# Patient Record
Sex: FEMALE | Race: OTHER | HISPANIC OR LATINO | URBAN - METROPOLITAN AREA
[De-identification: names, ages, dates, MRNs, and addresses within clinical notes are randomized per-mention and may not be internally consistent; named-entity substitution may affect disease eponyms.]

---

## 2020-02-04 ENCOUNTER — EMERGENCY (EMERGENCY)
Facility: HOSPITAL | Age: 55
LOS: 0 days | Discharge: HOME | End: 2020-02-04
Attending: EMERGENCY MEDICINE | Admitting: EMERGENCY MEDICINE
Payer: COMMERCIAL

## 2020-02-04 VITALS
RESPIRATION RATE: 20 BRPM | DIASTOLIC BLOOD PRESSURE: 72 MMHG | OXYGEN SATURATION: 98 % | HEART RATE: 126 BPM | SYSTOLIC BLOOD PRESSURE: 140 MMHG | TEMPERATURE: 101 F

## 2020-02-04 VITALS — HEART RATE: 86 BPM

## 2020-02-04 DIAGNOSIS — Z98.89 OTHER SPECIFIED POSTPROCEDURAL STATES: Chronic | ICD-10-CM

## 2020-02-04 DIAGNOSIS — M79.10 MYALGIA, UNSPECIFIED SITE: ICD-10-CM

## 2020-02-04 DIAGNOSIS — R11.2 NAUSEA WITH VOMITING, UNSPECIFIED: ICD-10-CM

## 2020-02-04 DIAGNOSIS — Z87.59 PERSONAL HISTORY OF OTHER COMPLICATIONS OF PREGNANCY, CHILDBIRTH AND THE PUERPERIUM: ICD-10-CM

## 2020-02-04 DIAGNOSIS — R50.9 FEVER, UNSPECIFIED: ICD-10-CM

## 2020-02-04 DIAGNOSIS — R05 COUGH: ICD-10-CM

## 2020-02-04 LAB
ALBUMIN SERPL ELPH-MCNC: 3.9 G/DL — SIGNIFICANT CHANGE UP (ref 3.5–5.2)
ALP SERPL-CCNC: 125 U/L — HIGH (ref 30–115)
ALT FLD-CCNC: 23 U/L — SIGNIFICANT CHANGE UP (ref 0–41)
ANION GAP SERPL CALC-SCNC: 16 MMOL/L — HIGH (ref 7–14)
AST SERPL-CCNC: 33 U/L — SIGNIFICANT CHANGE UP (ref 0–41)
BASE EXCESS BLDV CALC-SCNC: -2.1 MMOL/L — LOW (ref -2–2)
BASE EXCESS BLDV CALC-SCNC: -2.2 MMOL/L — LOW (ref -2–2)
BASE EXCESS BLDV CALC-SCNC: -3 MMOL/L — LOW (ref -2–2)
BASOPHILS # BLD AUTO: 0.01 K/UL — SIGNIFICANT CHANGE UP (ref 0–0.2)
BASOPHILS NFR BLD AUTO: 0.1 % — SIGNIFICANT CHANGE UP (ref 0–1)
BILIRUB SERPL-MCNC: 0.7 MG/DL — SIGNIFICANT CHANGE UP (ref 0.2–1.2)
BUN SERPL-MCNC: 30 MG/DL — HIGH (ref 10–20)
CA-I SERPL-SCNC: 1.08 MMOL/L — LOW (ref 1.12–1.3)
CA-I SERPL-SCNC: 1.08 MMOL/L — LOW (ref 1.12–1.3)
CA-I SERPL-SCNC: 1.09 MMOL/L — LOW (ref 1.12–1.3)
CALCIUM SERPL-MCNC: 8.7 MG/DL — SIGNIFICANT CHANGE UP (ref 8.5–10.1)
CHLORIDE SERPL-SCNC: 87 MMOL/L — LOW (ref 98–110)
CO2 SERPL-SCNC: 20 MMOL/L — SIGNIFICANT CHANGE UP (ref 17–32)
CREAT SERPL-MCNC: 1.2 MG/DL — SIGNIFICANT CHANGE UP (ref 0.7–1.5)
EOSINOPHIL # BLD AUTO: 0 K/UL — SIGNIFICANT CHANGE UP (ref 0–0.7)
EOSINOPHIL NFR BLD AUTO: 0 % — SIGNIFICANT CHANGE UP (ref 0–8)
GAS PNL BLDV: 129 MMOL/L — LOW (ref 136–145)
GAS PNL BLDV: 129 MMOL/L — LOW (ref 136–145)
GAS PNL BLDV: 133 MMOL/L — LOW (ref 136–145)
GAS PNL BLDV: SIGNIFICANT CHANGE UP
GLUCOSE SERPL-MCNC: 519 MG/DL — CRITICAL HIGH (ref 70–99)
HCO3 BLDV-SCNC: 22 MMOL/L — SIGNIFICANT CHANGE UP (ref 22–29)
HCO3 BLDV-SCNC: 22 MMOL/L — SIGNIFICANT CHANGE UP (ref 22–29)
HCO3 BLDV-SCNC: 23 MMOL/L — SIGNIFICANT CHANGE UP (ref 22–29)
HCT VFR BLD CALC: 34 % — LOW (ref 37–47)
HCT VFR BLDA CALC: 30.9 % — LOW (ref 34–44)
HCT VFR BLDA CALC: 34.3 % — SIGNIFICANT CHANGE UP (ref 34–44)
HCT VFR BLDA CALC: 35 % — SIGNIFICANT CHANGE UP (ref 34–44)
HGB BLD CALC-MCNC: 10.1 G/DL — LOW (ref 14–18)
HGB BLD CALC-MCNC: 11.2 G/DL — LOW (ref 14–18)
HGB BLD CALC-MCNC: 11.4 G/DL — LOW (ref 14–18)
HGB BLD-MCNC: 12 G/DL — SIGNIFICANT CHANGE UP (ref 12–16)
IMM GRANULOCYTES NFR BLD AUTO: 0.6 % — HIGH (ref 0.1–0.3)
LACTATE BLDV-MCNC: 1.9 MMOL/L — HIGH (ref 0.5–1.6)
LACTATE BLDV-MCNC: 2.7 MMOL/L — HIGH (ref 0.5–1.6)
LACTATE BLDV-MCNC: 3.3 MMOL/L — HIGH (ref 0.5–1.6)
LACTATE SERPL-SCNC: 2.1 MMOL/L — HIGH (ref 0.7–2)
LYMPHOCYTES # BLD AUTO: 0.68 K/UL — LOW (ref 1.2–3.4)
LYMPHOCYTES # BLD AUTO: 10.2 % — LOW (ref 20.5–51.1)
MCHC RBC-ENTMCNC: 31.7 PG — HIGH (ref 27–31)
MCHC RBC-ENTMCNC: 35.3 G/DL — SIGNIFICANT CHANGE UP (ref 32–37)
MCV RBC AUTO: 89.7 FL — SIGNIFICANT CHANGE UP (ref 81–99)
MONOCYTES # BLD AUTO: 0.66 K/UL — HIGH (ref 0.1–0.6)
MONOCYTES NFR BLD AUTO: 9.9 % — HIGH (ref 1.7–9.3)
NEUTROPHILS # BLD AUTO: 5.29 K/UL — SIGNIFICANT CHANGE UP (ref 1.4–6.5)
NEUTROPHILS NFR BLD AUTO: 79.2 % — HIGH (ref 42.2–75.2)
NRBC # BLD: 0 /100 WBCS — SIGNIFICANT CHANGE UP (ref 0–0)
PCO2 BLDV: 36 MMHG — LOW (ref 41–51)
PCO2 BLDV: 38 MMHG — LOW (ref 41–51)
PCO2 BLDV: 39 MMHG — LOW (ref 41–51)
PH BLDV: 7.37 — SIGNIFICANT CHANGE UP (ref 7.26–7.43)
PH BLDV: 7.38 — SIGNIFICANT CHANGE UP (ref 7.26–7.43)
PH BLDV: 7.4 — SIGNIFICANT CHANGE UP (ref 7.26–7.43)
PLATELET # BLD AUTO: 180 K/UL — SIGNIFICANT CHANGE UP (ref 130–400)
PO2 BLDV: 37 MMHG — SIGNIFICANT CHANGE UP (ref 20–40)
PO2 BLDV: 38 MMHG — SIGNIFICANT CHANGE UP (ref 20–40)
PO2 BLDV: 54 MMHG — HIGH (ref 20–40)
POTASSIUM BLDV-SCNC: 3.6 MMOL/L — SIGNIFICANT CHANGE UP (ref 3.3–5.6)
POTASSIUM BLDV-SCNC: 3.8 MMOL/L — SIGNIFICANT CHANGE UP (ref 3.3–5.6)
POTASSIUM BLDV-SCNC: 4.1 MMOL/L — SIGNIFICANT CHANGE UP (ref 3.3–5.6)
POTASSIUM SERPL-MCNC: 4.4 MMOL/L — SIGNIFICANT CHANGE UP (ref 3.5–5)
POTASSIUM SERPL-SCNC: 4.4 MMOL/L — SIGNIFICANT CHANGE UP (ref 3.5–5)
PROT SERPL-MCNC: 7.6 G/DL — SIGNIFICANT CHANGE UP (ref 6–8)
RBC # BLD: 3.79 M/UL — LOW (ref 4.2–5.4)
RBC # FLD: 11.9 % — SIGNIFICANT CHANGE UP (ref 11.5–14.5)
SAO2 % BLDV: 69 % — SIGNIFICANT CHANGE UP
SAO2 % BLDV: 71 % — SIGNIFICANT CHANGE UP
SAO2 % BLDV: 88 % — SIGNIFICANT CHANGE UP
SODIUM SERPL-SCNC: 123 MMOL/L — LOW (ref 135–146)
WBC # BLD: 6.68 K/UL — SIGNIFICANT CHANGE UP (ref 4.8–10.8)
WBC # FLD AUTO: 6.68 K/UL — SIGNIFICANT CHANGE UP (ref 4.8–10.8)

## 2020-02-04 PROCEDURE — 71046 X-RAY EXAM CHEST 2 VIEWS: CPT | Mod: 26

## 2020-02-04 PROCEDURE — 99284 EMERGENCY DEPT VISIT MOD MDM: CPT

## 2020-02-04 RX ORDER — INSULIN HUMAN 100 [IU]/ML
6 INJECTION, SOLUTION SUBCUTANEOUS ONCE
Refills: 0 | Status: COMPLETED | OUTPATIENT
Start: 2020-02-04 | End: 2020-02-04

## 2020-02-04 RX ORDER — SODIUM CHLORIDE 9 MG/ML
1000 INJECTION INTRAMUSCULAR; INTRAVENOUS; SUBCUTANEOUS ONCE
Refills: 0 | Status: COMPLETED | OUTPATIENT
Start: 2020-02-04 | End: 2020-02-04

## 2020-02-04 RX ORDER — ONDANSETRON 8 MG/1
4 TABLET, FILM COATED ORAL ONCE
Refills: 0 | Status: COMPLETED | OUTPATIENT
Start: 2020-02-04 | End: 2020-02-04

## 2020-02-04 RX ORDER — INSULIN HUMAN 100 [IU]/ML
8 INJECTION, SOLUTION SUBCUTANEOUS ONCE
Refills: 0 | Status: COMPLETED | OUTPATIENT
Start: 2020-02-04 | End: 2020-02-04

## 2020-02-04 RX ORDER — ACETAMINOPHEN 500 MG
650 TABLET ORAL ONCE
Refills: 0 | Status: COMPLETED | OUTPATIENT
Start: 2020-02-04 | End: 2020-02-04

## 2020-02-04 RX ORDER — KETOROLAC TROMETHAMINE 30 MG/ML
30 SYRINGE (ML) INJECTION ONCE
Refills: 0 | Status: DISCONTINUED | OUTPATIENT
Start: 2020-02-04 | End: 2020-02-04

## 2020-02-04 RX ORDER — SODIUM CHLORIDE 9 MG/ML
1000 INJECTION, SOLUTION INTRAVENOUS ONCE
Refills: 0 | Status: COMPLETED | OUTPATIENT
Start: 2020-02-04 | End: 2020-02-04

## 2020-02-04 RX ADMIN — SODIUM CHLORIDE 1000 MILLILITER(S): 9 INJECTION, SOLUTION INTRAVENOUS at 16:17

## 2020-02-04 RX ADMIN — INSULIN HUMAN 6 UNIT(S): 100 INJECTION, SOLUTION SUBCUTANEOUS at 17:51

## 2020-02-04 RX ADMIN — INSULIN HUMAN 8 UNIT(S): 100 INJECTION, SOLUTION SUBCUTANEOUS at 16:17

## 2020-02-04 RX ADMIN — SODIUM CHLORIDE 1000 MILLILITER(S): 9 INJECTION INTRAMUSCULAR; INTRAVENOUS; SUBCUTANEOUS at 14:24

## 2020-02-04 RX ADMIN — Medication 650 MILLIGRAM(S): at 14:22

## 2020-02-04 RX ADMIN — SODIUM CHLORIDE 1000 MILLILITER(S): 9 INJECTION INTRAMUSCULAR; INTRAVENOUS; SUBCUTANEOUS at 21:21

## 2020-02-04 RX ADMIN — Medication 650 MILLIGRAM(S): at 16:00

## 2020-02-04 RX ADMIN — ONDANSETRON 4 MILLIGRAM(S): 8 TABLET, FILM COATED ORAL at 14:23

## 2020-02-04 RX ADMIN — Medication 30 MILLIGRAM(S): at 16:17

## 2020-02-04 NOTE — ED PROVIDER NOTE - ATTENDING CONTRIBUTION TO CARE
53 y/o female with h/o dm, hld, htn, in ER with c/o flu-like symptoms which started 4 days ago.  + fever and chills.  + diffuse body aches.  + non-productive cough, rhinorrhea.  + N and V.  no cp/sob.  no abd pain.  no urinary symptoms.  no ha/dizziness/loc.    PE - nad, nc/at, eomi, perrl, op - clear, cta b/l, no w/r/r, rrr, abd- soft, nt/nd, nabs, from x 4, A&O x 3, no focal neuro deficits.  -ivf, check labs, cxr, re-eval.

## 2020-02-04 NOTE — ED PROVIDER NOTE - CLINICAL SUMMARY MEDICAL DECISION MAKING FREE TEXT BOX
Pt in ER with 4 day h/o flu-like symptoms. given ivf.  cxr - neg.  labs with glucose 515 - h/o DM, hasn't taken her meds for a few days due to not feeling well.  Na 123, but corrects to 135 due to hyperglycemia.  initial lactate 3.3, ph normal. pt feeling much better after IVF, repeat lactate 1.9, repeat , HR decreased to 80's, no longer feeling nauseated, pt tolerating PO.  to d/c home with continued oral hydration, prn tylenol/motrin.  given 4 days of symptoms no benefit to tamiflu. pt to f/u with pmd, told to return to ER if she feels worse or for any other new/concerning symptoms.  pt understands and agrees with plan.

## 2020-02-04 NOTE — ED PROVIDER NOTE - OBJECTIVE STATEMENT
53 y/o F, PMHx NIDDM, HTN, Dyslipidemia, & peripheral vascular disease (on eliquis), presents to the ED with complaints of body-aches and fever x four days. She denies admits to associated nasal congestion and non-productive cough; denies nausea, vomiting, chest pain, dyspnea, back pain, abdominal pain and recent travel. She commutes to the city however no known sick contacts recently. She took Tylenol 325mg at 0700 this AM.

## 2020-02-04 NOTE — ED PROVIDER NOTE - PROGRESS NOTE DETAILS
Patient states that she has not taken her Metformin and Trujenta for the past 4 days. Reviewed all results and necessity for follow up. Counseled on red flags and to return for them.  Patient appears well on discharge.

## 2020-02-04 NOTE — ED ADULT NURSE NOTE - OBJECTIVE STATEMENT
Patient is a 54 year old female presents to ED with complaints of flu-like symptoms since Saturday , nausea, vomiting, cough, fever body aches and congestion, and decreased Po intake. Patient denies any chest pain, abdominal pain, urinary symptoms.

## 2020-02-04 NOTE — ED PROVIDER NOTE - PATIENT PORTAL LINK FT
You can access the FollowMyHealth Patient Portal offered by [facility] by registering at the following website: [email or web address]. By joining [employer]’s FollowMyHealth portal, you will also be able to view your health information using other applications (apps) compatible with our system.

## 2020-02-04 NOTE — ED ADULT NURSE REASSESSMENT NOTE - NS ED NURSE REASSESS COMMENT FT1
patient assessed, patient alert and oriented x4. Labs sent pending results. awaiting chest x ray. Tylenol PO given for temp ,will reassess and monitor,

## 2022-04-29 ENCOUNTER — EMERGENCY (EMERGENCY)
Facility: HOSPITAL | Age: 57
LOS: 0 days | Discharge: HOME | End: 2022-04-29
Attending: EMERGENCY MEDICINE | Admitting: EMERGENCY MEDICINE
Payer: COMMERCIAL

## 2022-04-29 VITALS
TEMPERATURE: 97 F | RESPIRATION RATE: 18 BRPM | HEIGHT: 62 IN | DIASTOLIC BLOOD PRESSURE: 68 MMHG | WEIGHT: 184.97 LBS | SYSTOLIC BLOOD PRESSURE: 128 MMHG | HEART RATE: 99 BPM | OXYGEN SATURATION: 98 %

## 2022-04-29 DIAGNOSIS — M54.50 LOW BACK PAIN, UNSPECIFIED: ICD-10-CM

## 2022-04-29 DIAGNOSIS — E11.9 TYPE 2 DIABETES MELLITUS WITHOUT COMPLICATIONS: ICD-10-CM

## 2022-04-29 DIAGNOSIS — Z98.89 OTHER SPECIFIED POSTPROCEDURAL STATES: Chronic | ICD-10-CM

## 2022-04-29 DIAGNOSIS — Z79.01 LONG TERM (CURRENT) USE OF ANTICOAGULANTS: ICD-10-CM

## 2022-04-29 DIAGNOSIS — Z79.84 LONG TERM (CURRENT) USE OF ORAL HYPOGLYCEMIC DRUGS: ICD-10-CM

## 2022-04-29 DIAGNOSIS — Z79.82 LONG TERM (CURRENT) USE OF ASPIRIN: ICD-10-CM

## 2022-04-29 DIAGNOSIS — M25.552 PAIN IN LEFT HIP: ICD-10-CM
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
## 2022-04-29 DIAGNOSIS — I73.9 PERIPHERAL VASCULAR DISEASE, UNSPECIFIED: ICD-10-CM
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
## 2022-04-29 DIAGNOSIS — I10 ESSENTIAL (PRIMARY) HYPERTENSION: ICD-10-CM

## 2022-04-29 DIAGNOSIS — E78.5 HYPERLIPIDEMIA, UNSPECIFIED: ICD-10-CM

## 2022-04-29 PROCEDURE — 73502 X-RAY EXAM HIP UNI 2-3 VIEWS: CPT | Mod: 26,LT

## 2022-04-29 PROCEDURE — 99284 EMERGENCY DEPT VISIT MOD MDM: CPT

## 2022-04-29 RX ORDER — METHOCARBAMOL 500 MG/1
1500 TABLET, FILM COATED ORAL ONCE
Refills: 0 | Status: COMPLETED | OUTPATIENT
Start: 2022-04-29 | End: 2022-04-29

## 2022-04-29 RX ORDER — IBUPROFEN 200 MG
800 TABLET ORAL ONCE
Refills: 0 | Status: COMPLETED | OUTPATIENT
Start: 2022-04-29 | End: 2022-04-29

## 2022-04-29 RX ADMIN — Medication 800 MILLIGRAM(S): at 19:21

## 2022-04-29 RX ADMIN — METHOCARBAMOL 1500 MILLIGRAM(S): 500 TABLET, FILM COATED ORAL at 19:21

## 2022-04-29 NOTE — ED PROVIDER NOTE - PROGRESS NOTE DETAILS
MQ: Xray of left hip and pelvis normal, pain slightly improved, offered medications to be sent to the pharmacy, but patient refused, will d/c with PMD f/u
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
## 2022-04-29 NOTE — ED PROVIDER NOTE - NSFOLLOWUPINSTRUCTIONS_ED_ALL_ED_FT

## 2022-04-29 NOTE — ED PROVIDER NOTE - PATIENT PORTAL LINK FT
You can access the FollowMyHealth Patient Portal offered by [facility] by registering at the following website: [email or web address]. By joining "[employer]’s FollowMyHealth portal, you will also be able to view your health information using other applications (apps) compatible with our system.

## 2022-04-29 NOTE — ED ADULT NURSE NOTE - PRIMARY CARE PROVIDER
[FreeTextEntry1] : Lily is a 5 d/o ex-38.3 female born via  who presents for weight check and caput monitoring. She has had good weight gain since last visit (24g/day). Repeat bilirubin on DOL5 was low-risk. Physical exam today remarkable only for stable left parietal caput succadaneum, mild facial jaundice, and dry skin over torso and arms.\par \par #Caput succedaneum: size unchanged.\par - Will continue to monitor clinically.\par \par #Jaundice: resolving; repeat bilirubin on DOL5 13 (low-risk); now only on face.\par - Will continue to monitor clinically.\par \par #H/o right tongue deviation: not appreciated since in L&D.\par - Neuro appt scheduled for 3/5/20, but parents don't think they will keep it.\par \par #Dry skin:\par - Advised parents to avoid J&J.\par - Can apply Aquaphor/Vaseline immediately after bathing to lock in moisture.\par - Can consider baby versions of CeraVe, Eucerin, or Aveeno.\par \par #FHx of beta-thal minor:\par - NBS results still pending; will defer hematology consult until then.\par \par #Health maintenance:\par - Continue to breast-/bottle-feed ad isaías.\par - Anticipatory guidance given on when to worry about constipation, simethicone drop use, vaccine schedule, and danger of international air travel in 2020 while baby is still very young, has only just begun vaccination series, and it is still respiratory season.\par - V-UTD.\par - RTC in 2w for 1m WCC, or sooner if new concerns arise.  pcp

## 2022-04-29 NOTE — ED PROVIDER NOTE - NS ED ROS FT
Review of Systems:  •	CONSTITUTIONAL - No fever, No diaphoresis, No weight change  •	SKIN - No rash  •	HEMATOLOGIC - No abnormal bleeding or bruising  •	EYES - No eye pain, No blurred vision  •	ENT - No change in hearing, No sore throat, No neck pain, No rhinorrhea, No ear pain  •	RESPIRATORY - No shortness of breath, No cough  •	CARDIAC -No chest pain, No palpitations  •	GI - No abdominal pain, No nausea, No vomiting, No diarrhea, No constipation, No bright red blood per rectum or melena. No flank pain  •                 - No dysuria, frequency, hematuria.   •	ENDO - No polydypsia, No polyuria, No heat/cold intolerance  •	MUSCULOSKELETAL - + left hip pain, No swelling, + left lower back pain  •	NEUROLOGIC - No numbness, No focal weakness, No headache, No dizziness  All other systems negative, unless specified in HPI
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
## 2022-04-29 NOTE — ED PROVIDER NOTE - PHYSICAL EXAMINATION
CONSTITUTIONAL: Well-developed; well-nourished; in no acute distress.   SKIN: warm, dry  HEAD: Normocephalic; atraumatic.  EYES: no conjunctival injection. PERRL.   ENT: No nasal discharge; airway clear.  NECK: Supple; non tender.  CARD: S1, S2 normal; no murmurs, gallops, or rubs. Regular rate and rhythm.   RESP: No wheezes, rales or rhonchi.  ABD: soft ntnd  BACK: No midline tenderness, tenderness in left lower back  EXT: Normal ROM.  No clubbing, cyanosis or edema. Tender in left hip, but full ROM. Normal sensation and full strength in b/l LE. 2+ radial pulses b/l. Can ambulate normally.  LYMPH: No acute cervical adenopathy.  NEURO: Alert, oriented, grossly unremarkable  PSYCH: Cooperative, appropriate.

## 2022-04-29 NOTE — ED ADULT NURSE NOTE - NSICDXFAMILYHX_GEN_ALL_CORE_FT
FAMILY HISTORY:  Family history of acute myocardial infarction  Family history of diabetes mellitus    Sibling  Still living? Yes, Estimated age: Age Unknown  Family history of pancreatic cancer, Age at diagnosis: Age Unknown
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
## 2022-06-17 ENCOUNTER — EMERGENCY (EMERGENCY)
Facility: HOSPITAL | Age: 57
LOS: 0 days | Discharge: HOME | End: 2022-06-17
Attending: EMERGENCY MEDICINE | Admitting: EMERGENCY MEDICINE
Payer: COMMERCIAL

## 2022-06-17 VITALS
TEMPERATURE: 98 F | HEIGHT: 62 IN | OXYGEN SATURATION: 99 % | RESPIRATION RATE: 17 BRPM | HEART RATE: 84 BPM | DIASTOLIC BLOOD PRESSURE: 75 MMHG | WEIGHT: 184.97 LBS | SYSTOLIC BLOOD PRESSURE: 125 MMHG

## 2022-06-17 DIAGNOSIS — M54.6 PAIN IN THORACIC SPINE: ICD-10-CM

## 2022-06-17 DIAGNOSIS — G89.29 OTHER CHRONIC PAIN: ICD-10-CM

## 2022-06-17 DIAGNOSIS — Z87.828 PERSONAL HISTORY OF OTHER (HEALED) PHYSICAL INJURY AND TRAUMA: ICD-10-CM

## 2022-06-17 DIAGNOSIS — I10 ESSENTIAL (PRIMARY) HYPERTENSION: ICD-10-CM

## 2022-06-17 DIAGNOSIS — R51.9 HEADACHE, UNSPECIFIED: ICD-10-CM

## 2022-06-17 DIAGNOSIS — Z98.89 OTHER SPECIFIED POSTPROCEDURAL STATES: Chronic | ICD-10-CM

## 2022-06-17 DIAGNOSIS — E11.9 TYPE 2 DIABETES MELLITUS WITHOUT COMPLICATIONS: ICD-10-CM

## 2022-06-17 DIAGNOSIS — E78.5 HYPERLIPIDEMIA, UNSPECIFIED: ICD-10-CM

## 2022-06-17 DIAGNOSIS — Z79.84 LONG TERM (CURRENT) USE OF ORAL HYPOGLYCEMIC DRUGS: ICD-10-CM

## 2022-06-17 DIAGNOSIS — Z79.82 LONG TERM (CURRENT) USE OF ASPIRIN: ICD-10-CM

## 2022-06-17 PROCEDURE — 99284 EMERGENCY DEPT VISIT MOD MDM: CPT

## 2022-06-17 RX ORDER — KETOROLAC TROMETHAMINE 30 MG/ML
30 SYRINGE (ML) INJECTION ONCE
Refills: 0 | Status: DISCONTINUED | OUTPATIENT
Start: 2022-06-17 | End: 2022-06-17

## 2022-06-17 RX ORDER — IBUPROFEN 200 MG
1 TABLET ORAL
Qty: 21 | Refills: 0
Start: 2022-06-17 | End: 2022-06-23

## 2022-06-17 RX ORDER — TIZANIDINE 4 MG/1
1 TABLET ORAL
Qty: 21 | Refills: 0
Start: 2022-06-17 | End: 2022-06-23

## 2022-06-17 RX ORDER — METHOCARBAMOL 500 MG/1
1000 TABLET, FILM COATED ORAL ONCE
Refills: 0 | Status: COMPLETED | OUTPATIENT
Start: 2022-06-17 | End: 2022-06-17

## 2022-06-17 RX ADMIN — Medication 30 MILLIGRAM(S): at 10:43

## 2022-06-17 RX ADMIN — METHOCARBAMOL 1000 MILLIGRAM(S): 500 TABLET, FILM COATED ORAL at 10:43
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
## 2022-06-17 NOTE — ED PROVIDER NOTE - NSFOLLOWUPINSTRUCTIONS_ED_ALL_ED_FT
Our Emergency Department Referral Coordinators will be reaching out ot you in the next 24-48 hours from 9:00am to 5:00pm (Monday to Friday) with a follow up appointment. Please expect a phone call from the hospital in that time frame. If you do not receive a call or if you have any questions or concerns, you can reach them at [phone] or [phone].        Back Pain    Back pain is very common in adults. The cause of back pain is rarely dangerous and the pain often gets better over time. The cause of your back pain may not be known and may include strain of muscles or ligaments, degeneration of the spinal disks, or arthritis. Occasionally the pain may radiate down your leg(s). Over-the-counter medicines to reduce pain and inflammation are often the most helpful. Stretching and remaining active frequently helps the healing process.     SEEK IMMEDIATE MEDICAL CARE IF YOU HAVE THE FOLLOWING SYMPTOMS: bowel or bladder control problems, unusual weakness or numbness in your arms or legs, nausea or vomiting, abdominal pain, fever, dizziness/lightheadedness.

## 2022-06-17 NOTE — ED PROVIDER NOTE - CARE PROVIDER_API CALL
Diana Wise)  Neurosurgery  [address], [address]  [place] [address]  Phone: [phone]  Fax: [phone]  Follow Up Time:
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
## 2022-06-17 NOTE — ED PROVIDER NOTE - NS ED ATTENDING STATEMENT MOD
This was a shared visit with the MAYRA. I reviewed and verified the documentation and independently performed the documented:

## 2022-06-17 NOTE — ED PROVIDER NOTE - PHYSICAL EXAMINATION
VITAL SIGNS: I have reviewed nursing notes and confirm.  CONSTITUTIONAL: Well-developed; well-nourished; in no acute distress.   SKIN:  skin exam is warm and dry, no acute rash.    HEAD: Normocephalic; atraumatic.  EYES:  conjunctiva and sclera clear.  ENT: No nasal discharge; airway clear.  NECK: no c-spine tenderness Supple; non tender.  CARD: S1, S2 normal; no murmurs, gallops, or rubs. Regular rate and rhythm.   RESP: No wheezes, rales or rhonchi.  EXT: no midline spinal tenderness, muscle strength 5/5 throughout, sensation intact Normal ROM.  No clubbing, cyanosis or edema.   NEURO: Alert, oriented, grossly unremarkable

## 2022-06-17 NOTE — ED PROVIDER NOTE - NS ED ROS FT
Eyes:  No visual changes, eye pain or discharge.  ENMT:  No hearing changes, pain, discharge or infections. No neck pain or stiffness.  Cardiac:  No chest pain, SOB or edema. No chest pain with exertion.  Respiratory:  No cough or respiratory distress. No hemoptysis. No history of asthma or RAD.  GI:  No nausea, vomiting, diarrhea or abdominal pain.  :  No dysuria, frequency or burning.  MS:  + back pain No myalgia, muscle weakness, joint pain  Neuro:  + HA No  weakness.  No LOC.  Skin:  No skin rash.   Endocrine: + dm  No history of thyroid disease  Except as documented in the HPI,  all other systems are negative.
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
## 2022-06-17 NOTE — ED PROVIDER NOTE - PATIENT PORTAL LINK FT
You can access the FollowMyHealth Patient Portal offered by [facility] by registering at the following website: [email or web address]. By joining [employer]’s FollowMyHealth portal, you will also be able to view your health information using other applications (apps) compatible with our system.

## 2022-07-25 ENCOUNTER — EMERGENCY (EMERGENCY)
Facility: HOSPITAL | Age: 57
LOS: 0 days | Discharge: HOME | End: 2022-07-25
Attending: EMERGENCY MEDICINE | Admitting: EMERGENCY MEDICINE

## 2022-07-25 VITALS
HEART RATE: 79 BPM | HEIGHT: 62 IN | DIASTOLIC BLOOD PRESSURE: 79 MMHG | TEMPERATURE: 99 F | WEIGHT: 210.1 LBS | OXYGEN SATURATION: 100 % | RESPIRATION RATE: 20 BRPM | SYSTOLIC BLOOD PRESSURE: 182 MMHG

## 2022-07-25 DIAGNOSIS — S09.90XA UNSPECIFIED INJURY OF HEAD, INITIAL ENCOUNTER: ICD-10-CM

## 2022-07-25 DIAGNOSIS — S80.811A ABRASION, RIGHT LOWER LEG, INITIAL ENCOUNTER: ICD-10-CM

## 2022-07-25 DIAGNOSIS — Z87.891 PERSONAL HISTORY OF NICOTINE DEPENDENCE: ICD-10-CM

## 2022-07-25 DIAGNOSIS — E11.9 TYPE 2 DIABETES MELLITUS WITHOUT COMPLICATIONS: ICD-10-CM

## 2022-07-25 DIAGNOSIS — M25.552 PAIN IN LEFT HIP: ICD-10-CM

## 2022-07-25 DIAGNOSIS — Z86.73 PERSONAL HISTORY OF TRANSIENT ISCHEMIC ATTACK (TIA), AND CEREBRAL INFARCTION WITHOUT RESIDUAL DEFICITS: ICD-10-CM

## 2022-07-25 DIAGNOSIS — E78.5 HYPERLIPIDEMIA, UNSPECIFIED: ICD-10-CM

## 2022-07-25 DIAGNOSIS — M25.572 PAIN IN LEFT ANKLE AND JOINTS OF LEFT FOOT: ICD-10-CM

## 2022-07-25 DIAGNOSIS — M25.562 PAIN IN LEFT KNEE: ICD-10-CM
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
## 2022-07-25 DIAGNOSIS — Z23 ENCOUNTER FOR IMMUNIZATION: ICD-10-CM
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
## 2022-07-25 DIAGNOSIS — Z79.84 LONG TERM (CURRENT) USE OF ORAL HYPOGLYCEMIC DRUGS: ICD-10-CM

## 2022-07-25 DIAGNOSIS — Z98.89 OTHER SPECIFIED POSTPROCEDURAL STATES: Chronic | ICD-10-CM

## 2022-07-25 DIAGNOSIS — I10 ESSENTIAL (PRIMARY) HYPERTENSION: ICD-10-CM

## 2022-07-25 DIAGNOSIS — Z79.82 LONG TERM (CURRENT) USE OF ASPIRIN: ICD-10-CM

## 2022-07-25 PROCEDURE — 70450 CT HEAD/BRAIN W/O DYE: CPT | Mod: 26,MA

## 2022-07-25 PROCEDURE — 73610 X-RAY EXAM OF ANKLE: CPT | Mod: 26,LT

## 2022-07-25 PROCEDURE — 73562 X-RAY EXAM OF KNEE 3: CPT | Mod: 26,LT

## 2022-07-25 PROCEDURE — 99285 EMERGENCY DEPT VISIT HI MDM: CPT

## 2022-07-25 PROCEDURE — 73502 X-RAY EXAM HIP UNI 2-3 VIEWS: CPT | Mod: 26,LT

## 2022-07-25 RX ORDER — IBUPROFEN 200 MG
600 TABLET ORAL ONCE
Refills: 0 | Status: COMPLETED | OUTPATIENT
Start: 2022-07-25 | End: 2022-07-25

## 2022-07-25 RX ORDER — TETANUS TOXOID, REDUCED DIPHTHERIA TOXOID AND ACELLULAR PERTUSSIS VACCINE, ADSORBED 5; 2.5; 8; 8; 2.5 [IU]/.5ML; [IU]/.5ML; UG/.5ML; UG/.5ML; UG/.5ML
0.5 SUSPENSION INTRAMUSCULAR ONCE
Refills: 0 | Status: DISCONTINUED | OUTPATIENT
Start: 2022-07-25 | End: 2022-07-25

## 2022-07-25 RX ORDER — TETANUS TOXOID, REDUCED DIPHTHERIA TOXOID AND ACELLULAR PERTUSSIS VACCINE, ADSORBED 5; 2.5; 8; 8; 2.5 [IU]/.5ML; [IU]/.5ML; UG/.5ML; UG/.5ML; UG/.5ML
0.5 SUSPENSION INTRAMUSCULAR ONCE
Refills: 0 | Status: COMPLETED | OUTPATIENT
Start: 2022-07-25 | End: 2022-07-25

## 2022-07-25 RX ORDER — ACETAMINOPHEN 500 MG
650 TABLET ORAL ONCE
Refills: 0 | Status: COMPLETED | OUTPATIENT
Start: 2022-07-25 | End: 2022-07-25

## 2022-07-25 RX ADMIN — TETANUS TOXOID, REDUCED DIPHTHERIA TOXOID AND ACELLULAR PERTUSSIS VACCINE, ADSORBED 0.5 MILLILITER(S): 5; 2.5; 8; 8; 2.5 SUSPENSION INTRAMUSCULAR at 16:49

## 2022-07-25 RX ADMIN — Medication 650 MILLIGRAM(S): at 11:00
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
## 2022-07-25 NOTE — ED ADULT NURSE NOTE - NS ED NURSE RECORD ANOTHER VITAL SIGN
Yes
How Severe Is Your Skin Lesion?: moderate
Has Your Skin Lesion Been Treated?: not been treated
Is This A New Presentation, Or A Follow-Up?: Growth
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
## 2022-07-25 NOTE — ED PROVIDER NOTE - PHYSICAL EXAMINATION
CONST: Well appearing in NAD  EYES: PERRL, EOMI, Sclera and conjunctiva clear. Vision grossly intact  ENT: No nasal discharge. TM's clear B/L without drainage. Oropharynx normal appearing, no erythema or exudates. Uvula midline.  NECK: Non-tender, no meningeal signs  CARD: Normal S1 S2; Normal rate and rhythm  RESP: Equal BS B/L, No wheezes, rhonchi or rales. No distress  GI: Soft, non-tender, non-distended. No rebound or Guarding  MS: tenderness over left hip, patella. FROM all extremities no ligament laxity, foot and ankle normal  SKIN: Warm, dry, no acute rashes. Good turgor  NEURO: A&Ox3, No focal deficits. Strength 5/5 with no sensory deficits. Steady gait

## 2022-07-25 NOTE — ED PROVIDER NOTE - PATIENT PORTAL LINK FT
You can access the FollowMyHealth Patient Portal offered by [facility] by registering at the following website: [email or web address]. By joining [employer]’s FollowMyHealth portal, you will also be able to view your health information using other applications (apps) compatible with our system.

## 2022-07-25 NOTE — ED PROVIDER NOTE - CARE PROVIDER_API CALL
Jose De Jesus Bustamante (MD)  Orthopaedic Surgery  [address] [place] [address]  Phone: [phone]  Fax: [phone]  Follow Up Time:

## 2022-07-25 NOTE — ED ADULT NURSE NOTE - IN THE PAST 12 MONTHS HAVE YOU USED DRUGS OTHER THAN THOSE REQUIRED FOR MEDICAL REASON?
Vital Signs Last 24 Hrs  T(C): 36.7 (04 Feb 2019 20:22), Max: 36.7 (04 Feb 2019 20:22)  T(F): 98 (04 Feb 2019 20:22), Max: 98 (04 Feb 2019 20:22)  HR: 75 (04 Feb 2019 23:15) (70 - 82)  BP: 127/80 (04 Feb 2019 23:15) (115/77 - 196/89)  BP(mean): 92 (04 Feb 2019 23:15) (90 - 118)  RR: 19 (04 Feb 2019 23:15) (15 - 20)  SpO2: 94% (04 Feb 2019 23:15) (94% - 98%)    GEN: NAD, resting comfortably    Neuro: A&O x 3    RLE:  skin intact, no erythema or ecchymosis  gross deformity extremity shortened and ER  SILT L3-S1  unable to SLR due to Pain  Pain with log roll  EHL/FHL/TA/GSC intact  DP pulse 2+  compartments soft and compressible  no calf tenderness    Secondary Survey: No TTP over bony prominences, SILT, palpable pulses, full/painless range of motion, compartments soft, able to SLR RLE, No palpable bony step offs midline back. No

## 2022-07-25 NOTE — ED PROVIDER NOTE - CARE PLAN
1 Principal Discharge DX:	Fall down stairs, initial encounter  Secondary Diagnosis:	Left knee pain   Principal Discharge DX:	Fall down stairs, initial encounter  Secondary Diagnosis:	Left knee pain  Secondary Diagnosis:	Head injury

## 2022-07-25 NOTE — ED PROVIDER NOTE - OBJECTIVE STATEMENT
58 yo f presents for left knee and ankle pain after fall. PT states that [place] 3[place] Pt states that she was able to ambulate w assistance. Denies ha, vision change, head injury, loc, dizziness , lightheadedness or syncope. Pain is worse w movement and walking.

## 2022-07-25 NOTE — ED ADULT TRIAGE NOTE - CHIEF COMPLAINT QUOTE
BIBA from the [facility] terminal, pt slipped down 4 stairs at the [facility] terminal, has lacerations to her left knee. c/o left lower leg and left ankle pain. denies head injury, denies LOC.   as per EMS, pt's blood glucose level was 469 on scene.
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
## 2022-07-25 NOTE — ED PROVIDER NOTE - NS ED ROS FT
CONST: No fever, chills or bodyaches  EYES: No pain, redness, drainage or visual changes.  ENT: No ear pain or discharge, nasal discharge or congestion. No sore throat  CARD: No chest pain, palpitations  RESP: No SOB, cough, hemoptysis. No hx of asthma or COPD  GI: No abdominal pain, N/V/D  : No urinary symptoms  MS: Nleft knee, ankle and hip pain  SKIN: abrasion to left knee  NEURO: No headache, dizziness, paresthesias or LOC

## 2022-07-25 NOTE — ED ADULT NURSE NOTE - OBJECTIVE STATEMENT
pt presents for left knee and ankle pain after fall. PT states that she [place] 3[place]4 [place] Pt states that she was able to ambulate w assistance. Denies ha, vision change, head injury, loc, dizziness , lightheadedness or syncope. Pain is worse w movement and walking.

## 2022-08-16 ENCOUNTER — APPOINTMENT (OUTPATIENT)
Dept: ORTHOPEDIC SURGERY | Facility: CLINIC | Age: 57
End: 2022-08-16

## 2022-08-16 ENCOUNTER — NON-APPOINTMENT (OUTPATIENT)
Age: 57
End: 2022-08-16

## 2022-08-16 VITALS — HEIGHT: 62 IN | WEIGHT: 190 LBS | BODY MASS INDEX: 34.96 KG/M2

## 2022-08-16 DIAGNOSIS — Z86.39 PERSONAL HISTORY OF OTHER ENDOCRINE, NUTRITIONAL AND METABOLIC DISEASE: ICD-10-CM

## 2022-08-16 DIAGNOSIS — S80.12XA CONTUSION OF LEFT LOWER LEG, INITIAL ENCOUNTER: ICD-10-CM

## 2022-08-16 DIAGNOSIS — Z86.73 PERSONAL HISTORY OF TRANSIENT ISCHEMIC ATTACK (TIA), AND CEREBRAL INFARCTION W/OUT RESIDUAL DEFICITS: ICD-10-CM

## 2022-08-16 PROBLEM — Z00.00 ENCOUNTER FOR PREVENTIVE HEALTH EXAMINATION: Status: ACTIVE | Noted: 2022-08-16

## 2022-08-16 PROCEDURE — 99203 OFFICE O/P NEW LOW 30 MIN: CPT

## 2022-08-16 PROCEDURE — 73590 X-RAY EXAM OF LOWER LEG: CPT | Mod: LT
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
## 2022-08-16 NOTE — DATA REVIEWED
[FreeTextEntry1] :   X-rays reviewed on the [facility] PACS system from 07/25/2022 show no acute displaced fractures or bony abnormalities.\par \par X-rays repeated in the office of her left tib-fib show no acute or healing displaced fractures or bony abnormalities.
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
## 2022-08-16 NOTE — PHYSICAL EXAM
[de-identified] :   Physical exam of her left lower leg:  She has a large healing scabbed over abrasion from the proximal to mid shaft of the tibia.  There is resolving ecchymosis.  Hematoma surrounding the wound.  No signs of drainage, pus, or infection.  She is exquisitely tender surrounding the anterior tibia.  Calves are soft and nontender.  Good motion of her knee, foot, and ankle.  Decreased strength in her left lower extremity.  Sensory and motor are intact.

## 2022-08-17 ENCOUNTER — APPOINTMENT (OUTPATIENT)
Dept: PAIN MANAGEMENT | Facility: CLINIC | Age: 57
End: 2022-08-17

## 2022-08-17 VITALS
DIASTOLIC BLOOD PRESSURE: 102 MMHG | HEART RATE: 102 BPM | SYSTOLIC BLOOD PRESSURE: 170 MMHG | HEIGHT: 62 IN | BODY MASS INDEX: 34.96 KG/M2 | WEIGHT: 190 LBS

## 2022-08-17 PROCEDURE — 99204 OFFICE O/P NEW MOD 45 MIN: CPT

## 2022-08-17 RX ORDER — MELOXICAM 15 MG/1
15 TABLET ORAL DAILY
Qty: 30 | Refills: 0 | Status: ACTIVE | COMMUNITY
Start: 2022-08-17 | End: 1900-01-01

## 2022-08-17 NOTE — DATA REVIEWED
[MRI] : MRI [Cervical Spine] : cervical spine [Lumbar Spine] : lumbar spine [Report was reviewed and noted in the chart] : The report was reviewed and noted in the chart [FreeTextEntry1] : 8/14/22 MRI lumbar spine NC-L3-4 bilobed protrusion left worse than right impinging on L3 nerve root, b/l facet effusions and facet arthropathy at L4-5. MRI c spine 8/14/2022 shows C4-5 central protrusion and C5-6  left sided protrusion with disc space narrowing. There is subtle cord signal on the left in the STIR image. Thoracic MRI NC shows T11-12 having mod to severe facet arthrosis with mild to moderate kyphosis, mild T11 foraminal stenosis

## 2022-08-17 NOTE — HISTORY OF PRESENT ILLNESS
[FreeTextEntry1] : 57 year old [place]. She had a full trauma work-up ordered by my orthopedic colleagues which was negative for fractures. She has neck, thoracic, lumbar pain with allodynia and numbness of the left shin and numbness of the right shin. The overall pain is 6/10 at best and 10/10 at worst. The pain is constant during the day that is better or worse with anything. The pain is sharp in nature. She denies any headaches, blurry vision, nausea, vomiting, bowel/bladder symptoms.

## 2022-08-17 NOTE — PHYSICAL EXAM
[de-identified] : Lumbar Spine Exam:\par \par Inspection:\par erythema (-)\par ecchymosis (-)\par rashes (-)\par alignment: no scoliosis, (_)\par \par Palpation:\par Midline lumbar tenderness:             (-)\par midline thoracic tenderness:          (-)\par paraspinal tenderness:                  L (+) ; R (+)\par thoracic paraspinal tenderness:    L (+) ; R (+)\par SI joint tenderness:                        L (-) ; R (-)\par GTB tenderness:                            L (-);  R (-)\par \par ROM:  \par Full ROM with mild stiffness\par Stiffness and stiffness with flexion, extension, lateral rotation\par \par Strength:\par antigravity in UE and LE b/l\par \par                         \par \par Special Tests:\par seated:                           R (+) ; L (-)\par Facet loading:            R (-) ; L (-)\par \par \par Neurologic:\par Light touch intact throughout LE with exception b/l anterior legs which were numb and the left leg was painful out of proportion to light touch\par \par Gait:\par antalgic gait\par \par Cervical Spine Exam:\par \par Inspection:\par \par erythema (-) \par ecchymosis (-) \par rashes (-) \par \par Palpation:   \par                                                \par Cervical paraspinal mm tenderness:   R (+); L(+)\par Upper trapezius mm tenderness:        R (+); L (+)\par \par \par ROM:  \par Full ROM with mild stiffness\par Stiffness and pain with extension, flexion\par \par Special Testing:\par Spurling Test:                  R (-); L (-)\par Facet load test:               R (+); L (+)          \par \par \par \par

## 2022-08-17 NOTE — REASON FOR VISIT
[Initial Consultation] : an initial pain management consultation [FreeTextEntry2] : left leg shin pan and back pain

## 2022-08-17 NOTE — DISCUSSION/SUMMARY
[de-identified] : A discussion regarding available pain management treatment options occurred with the patient.  These included interventional, rehabilitative, pharmacological, and alternative modalities. We will proceed with the following:\par \par Interventional treatment options:\par - discussed the option for a lumbar epidural but the patient was interested in trying mobic 15 mg that I prescribed first. I discussed the case with Dr. Charlton over the phone. \par \par Rehabilitative options:\par - she has performed a full session of PT and now undergoing chiropractic medicine with Dr. Charlton\par \par Medication based treatment options:\par - initiate trial of mobic 15 mg for 7-10 days and then prn\par \par Complementary treatment options:\par - discussed ergonomic modifications\par \par follow-up in 1-2 weeks\par

## 2022-08-31 ENCOUNTER — APPOINTMENT (OUTPATIENT)
Dept: PAIN MANAGEMENT | Facility: CLINIC | Age: 57
End: 2022-08-31

## 2022-08-31 VITALS
HEART RATE: 92 BPM | WEIGHT: 190 LBS | HEIGHT: 62 IN | DIASTOLIC BLOOD PRESSURE: 82 MMHG | SYSTOLIC BLOOD PRESSURE: 144 MMHG | BODY MASS INDEX: 34.96 KG/M2

## 2022-08-31 DIAGNOSIS — M47.814 SPONDYLOSIS W/OUT MYELOPATHY OR RADICULOPATHY, THORACIC REGION: ICD-10-CM

## 2022-08-31 DIAGNOSIS — G90.522 COMPLEX REGIONAL PAIN SYNDROME I OF LEFT LOWER LIMB: ICD-10-CM

## 2022-08-31 DIAGNOSIS — M79.18 MYALGIA, OTHER SITE: ICD-10-CM

## 2022-08-31 DIAGNOSIS — M54.16 RADICULOPATHY, LUMBAR REGION: ICD-10-CM

## 2022-08-31 DIAGNOSIS — M47.812 SPONDYLOSIS W/OUT MYELOPATHY OR RADICULOPATHY, CERVICAL REGION: ICD-10-CM

## 2022-08-31 PROCEDURE — 99214 OFFICE O/P EST MOD 30 MIN: CPT | Mod: 25

## 2022-08-31 PROCEDURE — 20553 NJX 1/MLT TRIGGER POINTS 3/>: CPT | Mod: LT

## 2022-08-31 NOTE — PROCEDURE
[Left] : of the left [Cervical paraspinal muscle] : cervical paraspinal muscle [Other: ____] : [unfilled] [Pain] : pain [Inflammation] : inflammation [Alcohol] : alcohol [___ cc    0.25%] : Bupivacaine (Marcaine) ~Vcc of 0.25%  [Previous OTC use and PT nontherapeutic] : patient has tried OTC's including aspirin, Ibuprofen, Aleve, etc or prescription NSAIDS, and/or exercises at home and/or physical therapy without satisfactory response [Risks, benefits, alternatives discussed / Verbal consent obtained] : the risks benefits, and alternatives have been discussed, and verbal consent was obtained

## 2022-08-31 NOTE — REASON FOR VISIT
[Follow-Up Visit] : a follow-up pain management visit [FreeTextEntry2] : follow up for back and leg pain

## 2022-08-31 NOTE — PHYSICAL EXAM
[de-identified] : Lumbar Spine Exam:\par \par Inspection:\par erythema (-)\par ecchymosis (-)\par rashes (-)\par alignment: no scoliosis, (_)\par \par Palpation:\par Midline lumbar tenderness:             (-)\par midline thoracic tenderness:          (-)\par paraspinal tenderness:                  L (+) ; R (+)\par thoracic paraspinal tenderness:    L (+) ; R (+)\par SI joint tenderness:                        L (-) ; R (-)\par GTB tenderness:                            L (-);  R (-)\par \par ROM:  \par Full ROM with mild stiffness\par Stiffness and stiffness with flexion, extension, lateral rotation\par \par Strength:\par antigravity in UE and LE b/l\par \par                         \par \par Special Tests:\par seated:                           R (+) ; L (-)\par Facet loading:            R (-) ; L (-)\par \par \par Neurologic:\par Light touch intact throughout LE with exception b/l anterior legs which were numb and the left leg was painful out of proportion to light touch\par \par Gait:\par antalgic gait\par \par Cervical Spine Exam:\par \par Inspection:\par \par erythema (-) \par ecchymosis (-) \par rashes (-) \par \par Palpation:   \par                                                \par Cervical paraspinal mm tenderness:   R (+); L(+)\par Upper trapezius mm tenderness:        R (+); L (+)\par \par \par ROM:  \par Full ROM with mild stiffness\par Stiffness and pain with extension, flexion\par \par Special Testing:\par Spurling Test:                  R (-); L (-)\par Facet load test:               R (+); L (+)          \par \par \par \par
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
## 2022-08-31 NOTE — HISTORY OF PRESENT ILLNESS
[FreeTextEntry1] : 57 year old [place] She had a full trauma work-up ordered by my orthopedic colleagues which was negative for fractures. She has neck, thoracic, lumbar pain with allodynia and numbness of the left shin and numbness of the right shin. The overall pain is 6/10 at best and 10/10 at worst. The pain is constant during the day that is better or worse with anything. The pain is sharp in nature. She denies any headaches, blurry vision, nausea, vomiting, bowel/bladder symptoms. \par \par interval hx 8/31/22\par \par Patient has only 10% relief after taking mobic for 2 weeks along with PT and chiropractic medicine. She continues to have neck pain that radiates to both shoulders, has numbness in both hands. She continues to have low back pain that radiates down her left leg and foot and has b/l shin pain with allodynia.

## 2022-09-21 ENCOUNTER — APPOINTMENT (OUTPATIENT)
Dept: PAIN MANAGEMENT | Facility: CLINIC | Age: 57
End: 2022-09-21

## 2024-02-05 NOTE — ED PROVIDER NOTE - DISPOSITION TYPE
[FreeTextEntry1] : GIL GRAF is a 82 year old female presents for evaluation.   > Recurrent right leg DVT - Saw heme. DVT thought to be provoked. Undergoing 6 month treatment w/ AC. Plan for follow up with [facility] in April 2024.  - Due to worsening swelling, repeat duplex performed. No acute DVT.  - Continue ac per [facility] recommendations.  - Leg compression (wrapped in ace bandage bilaterally today), leg elevation, and ambulation.  - Plan for follow up in six months.  DISCHARGE

## 2024-04-26 NOTE — ED ADULT TRIAGE NOTE - NSWEIGHTCALCTOOLDRUG_GEN_A_CORE
HPI     Post-op Evaluation            Comments: Pt reports lost to f/u. Denies any pain or irritation. Va   stable. 100% compliant with gtts, ran out of keto, last drop on Monday.           Comments    1. Coag severe stage   Clearpath OD 3/4/24 - opening date in/around April 1-15   XEN OD 10/13/2022  [facility]MC OD 11/4/2022 ( 0.5ml)  2. NS OS *Will do dropless/ plan for OMNI 360  PCIOL OD 1/25/22 W/ Gonio- complex  Yag OD 5-3-22 by [facility] since Dr Pitts was out that visit         Latanoprost qhs OS  Brimonidine BID OU  Dorzolamide/Timolol BID OU    Pred/Keto QID OD             Last edited by Shorty Hu on 4/26/2024  9:08 AM.            Assessment /Plan     For exam results, see Encounter Report.      ICD-10-CM ICD-9-CM    1. Post-operative state  Z98.890 V45.89 S/p clearpath OD  Doing well      2. Primary open angle glaucoma (POAG) of both eyes, severe stage  H40.1133  IOP OS above target  Recommend PHACO OS w/ clearpath- pt defers  Pt understands she will continue to lose vision without surgical intervention   May schedule SLT OS at next visit      365.73       3. Nuclear sclerosis of left eye  H25.12 366.16 Recommend PHACO OS w/ clearpath, complex, mal ring  Pt defers surgery at this time          Recommend to return in 4 weeks, pt prefers to come at 5 weeks      Latanoprost qhs OS  Brimonidine BID OS  Dorzolamide/Timolol BID OS  Pred QID OD       
 used

## 2024-07-25 NOTE — ED PROVIDER NOTE - PRO INTERPRETER NEED 2
[EKG obtained to assist in diagnosis and management of assessed problem(s)] : EKG obtained to assist in diagnosis and management of assessed problem(s) English

## 2024-08-01 NOTE — ED ADULT NURSE NOTE - IN THE PAST 12 MONTHS HAVE YOU USED DRUGS OTHER THAN THOSE REQUIRED FOR MEDICAL REASON?
[FreeTextEntry1] : 7/31/20 ([facility]) b/l screening mammogram/US: scattered areas of fibroglandular density. No suspicious masses, architectural distortion, or significant calcifications are detected. Nodularity in the anterior outer right breast is unchanged. Nodularity with associated calcification noted left retroareolar breast is unchanged. Right breast: There are no solid or suspicious lesions identified in the right breast. Left breast:Subareolar breast measuring 0.3 x 0.4 x 0.2 cm. Possible solid nodule. New. There is no pathological lymphadenopathy in either axilla. IMPRESSION: Possible new solid nodule in the immediate subareolar left breast identified sonographically. FOLLOW-UP: Additional imaging. Targeted sonography of the immediate subareolar left breast directly radiology supervision recommended to determine if findings represent true new mass. ASSESSMENT: BI-RADS Category 0  8/12/20 ([facility]) left breast US: In the periareolar left breast within the skin there is a 0.3 cm hypoechoic nodule noted. This has the appearance of a sebaceous cyst. No lesions are identified within the breast tissue. IMPRESSION: Findings suggestive of a sebaceous cyst within the left periareolar region. This is new. FOLLOW-UP: Follow-up imaging in 6 months. Six-month follow-up targeted left breast sonography recommended to ensure stability or evaluate for resolution. ASSESSMENT: BI-RADS Category 3: Probably benign.  5/20/21 ([facility]) left breast US: Subareolar breast measuring 0.3 x 0.3 x 0.2 cm. Sebaceous cyst. Unchanged. IMPRESSION: Stable cysts noted left subareolar breast. FOLLOW-UP: Follow-up imaging in 2 months. BI-RADS Category 3: Probably benign.  8/29/23 ([facility]) TSH: 4.820 (high, ref 0.270-4.2)  8/29/23 ([facility]) prolactin: 4.3 (ref 3.4-24.1).   11/28/23 ([facility]) b/l dx mammogram/US: breasts are almost entirely fatty, There is a new questioned mass with questioned distortion in the superficial right upper outer quadrant which likely corresponds with an isoechoic mass with cystic spaces on subsequent ultrasound. Ultrasound-guided core biopsy is recommended. At the posterior right breast 12 o'clock axis, there are two adjacent approximately 6-7 mm masses which were not present on the prior mammograms and have no sonographic correlate. Six-month follow-up right unilateral mammography is recommended. The left breast is unremarkable. 1. Ultrasound-guided core biopsy is recommended of a right breast 9 o'clock axis sonographic lesion which likely corresponds with a right upper outer quadrant mammographic mass. If post procedure mammogram demonstrates that the biopsied mass does not correspond with the mammographic finding, stereotactic core biopsy should be considered. 2. Six-month follow-up right unilateral mammography is recommended to assess for stability or change of probably benign mammographic masses at the posterior right breast 12 o'clock axis. FOLLOW-UP: Ultrasound guided biopsy. ASSESSMENT: BI-RADS Category 4: Suspicious. 
No

## 2025-05-02 NOTE — ED ADULT NURSE NOTE - NSICDXPASTMEDICALHX_GEN_ALL_CORE_FT
Attended Phase II Cardiac Rehab. No medication or health history changes reported. See [facility] for details.   PAST MEDICAL HISTORY:  Diabetes mellitus